# Patient Record
(demographics unavailable — no encounter records)

---

## 2018-11-14 NOTE — RAD
CERVICAL SPINE AP AND LATERAL AND STANDARD:

 

HISTORY: 

Followup post surgery.

 

COMPARISON: 

Radiograph 5/9/2018.

 

FINDINGS: 

Similar-appearing ACDF hardware from C4-C7 with diskectomy changes.  Mild narrowing of the C3-4 disk 
space.  No hardware complication.  No acute fracture or malalignment.  Open-mouth odontoid view is no
rmal.

 

There appear to be some chronic interstitial markings in the lung apices. 

 

IMPRESSION: 

Similar examination with loss of the anterior inferior C4 vertebral body likely from diskectomy nancy stapleton

 

 

POS: CET

## 2020-03-16 NOTE — RAD
Three-view cervical spine



INDICATION: Cervical radiculopathy



COMPARISON: Prior cervical spine exam dated November 14, 2018



FINDINGS: The ACDF involving C4-C7 is stable appearing. There is no evidence of hardware loosening or
 failure. Lateral masses are symmetric. Spinal alignment is within normal limits. Lung apices are

clear.



IMPRESSION: Stable postoperative cervical spine



Reported By: Norman Dang 

Electronically Signed:  3/16/2020 1:55 PM